# Patient Record
Sex: FEMALE | Race: OTHER | Employment: OTHER | URBAN - METROPOLITAN AREA
[De-identification: names, ages, dates, MRNs, and addresses within clinical notes are randomized per-mention and may not be internally consistent; named-entity substitution may affect disease eponyms.]

---

## 2022-06-11 ENCOUNTER — HOSPITAL ENCOUNTER (OUTPATIENT)
Dept: GENERAL RADIOLOGY | Facility: HOSPITAL | Age: 72
Discharge: HOME | End: 2022-06-11
Attending: INTERNAL MEDICINE
Payer: COMMERCIAL

## 2022-06-11 ENCOUNTER — LAB ENCOUNTER (OUTPATIENT)
Dept: LAB | Facility: HOSPITAL | Age: 72
End: 2022-06-11
Attending: INTERNAL MEDICINE
Payer: COMMERCIAL

## 2022-06-11 ENCOUNTER — OFFICE VISIT (OUTPATIENT)
Dept: RHEUMATOLOGY | Facility: CLINIC | Age: 72
End: 2022-06-11
Payer: COMMERCIAL

## 2022-06-11 VITALS
HEART RATE: 69 BPM | WEIGHT: 159.69 LBS | SYSTOLIC BLOOD PRESSURE: 134 MMHG | BODY MASS INDEX: 31.35 KG/M2 | DIASTOLIC BLOOD PRESSURE: 72 MMHG | HEIGHT: 60 IN

## 2022-06-11 DIAGNOSIS — H04.123 BILATERAL DRY EYES: ICD-10-CM

## 2022-06-11 DIAGNOSIS — M06.4 INFLAMMATORY POLYARTHRITIS (HCC): ICD-10-CM

## 2022-06-11 DIAGNOSIS — M54.2 NECK PAIN: ICD-10-CM

## 2022-06-11 DIAGNOSIS — M06.4 INFLAMMATORY POLYARTHRITIS (HCC): Primary | ICD-10-CM

## 2022-06-11 DIAGNOSIS — M10.9 GOUT OF MULTIPLE SITES, UNSPECIFIED CAUSE, UNSPECIFIED CHRONICITY: ICD-10-CM

## 2022-06-11 DIAGNOSIS — M54.50 CHRONIC BILATERAL LOW BACK PAIN WITHOUT SCIATICA: ICD-10-CM

## 2022-06-11 DIAGNOSIS — M79.18 DIFFUSE MYOFASCIAL PAIN SYNDROME: ICD-10-CM

## 2022-06-11 DIAGNOSIS — G89.29 CHRONIC BILATERAL LOW BACK PAIN WITHOUT SCIATICA: ICD-10-CM

## 2022-06-11 LAB
CRP SERPL-MCNC: <0.29 MG/DL (ref ?–0.3)
ERYTHROCYTE [SEDIMENTATION RATE] IN BLOOD: 11 MM/HR
RHEUMATOID FACT SERPL-ACNC: <10 IU/ML (ref ?–15)
URATE SERPL-MCNC: 6.1 MG/DL

## 2022-06-11 PROCEDURE — 85652 RBC SED RATE AUTOMATED: CPT

## 2022-06-11 PROCEDURE — 86038 ANTINUCLEAR ANTIBODIES: CPT

## 2022-06-11 PROCEDURE — 86140 C-REACTIVE PROTEIN: CPT

## 2022-06-11 PROCEDURE — 73130 X-RAY EXAM OF HAND: CPT | Performed by: INTERNAL MEDICINE

## 2022-06-11 PROCEDURE — 84550 ASSAY OF BLOOD/URIC ACID: CPT

## 2022-06-11 PROCEDURE — 36415 COLL VENOUS BLD VENIPUNCTURE: CPT

## 2022-06-11 PROCEDURE — 86431 RHEUMATOID FACTOR QUANT: CPT

## 2022-06-11 PROCEDURE — 72100 X-RAY EXAM L-S SPINE 2/3 VWS: CPT | Performed by: INTERNAL MEDICINE

## 2022-06-11 PROCEDURE — 86200 CCP ANTIBODY: CPT

## 2022-06-11 PROCEDURE — 72040 X-RAY EXAM NECK SPINE 2-3 VW: CPT | Performed by: INTERNAL MEDICINE

## 2022-06-11 PROCEDURE — 73630 X-RAY EXAM OF FOOT: CPT | Performed by: INTERNAL MEDICINE

## 2022-06-11 PROCEDURE — 73560 X-RAY EXAM OF KNEE 1 OR 2: CPT | Performed by: INTERNAL MEDICINE

## 2022-06-11 RX ORDER — MEMANTINE HYDROCHLORIDE 10 MG/1
10 TABLET ORAL 2 TIMES DAILY
COMMUNITY
Start: 2022-04-20

## 2022-06-11 RX ORDER — LISINOPRIL 20 MG/1
40 TABLET ORAL EVERY MORNING
COMMUNITY
Start: 2022-04-12

## 2022-06-11 RX ORDER — DONEPEZIL HYDROCHLORIDE 5 MG/1
5 TABLET, FILM COATED ORAL NIGHTLY
COMMUNITY
Start: 2022-06-04

## 2022-06-11 RX ORDER — BLACK COHOSH ROOT EXTRACT 80 MG
1 CAPSULE ORAL DAILY
COMMUNITY

## 2022-06-11 RX ORDER — CARVEDILOL 6.25 MG/1
6.25 TABLET ORAL 2 TIMES DAILY
COMMUNITY
Start: 2022-06-07

## 2022-06-11 RX ORDER — ASCORBIC ACID 250 MG
250 TABLET ORAL DAILY
COMMUNITY

## 2022-06-11 RX ORDER — OMEPRAZOLE 20 MG/1
20 CAPSULE, DELAYED RELEASE ORAL 2 TIMES DAILY
COMMUNITY
Start: 2022-04-18

## 2022-06-11 RX ORDER — HYDROXYZINE HYDROCHLORIDE 25 MG/1
1 TABLET, FILM COATED ORAL NIGHTLY
COMMUNITY
Start: 2022-06-03

## 2022-06-11 NOTE — PROGRESS NOTES
Dear Dr. Rosibel Mcclendon:    I saw your patient Sarai Recinos in consultation this morning in my rheumatology clinic. A  Shai Tam ([id number]) was on the phone line with us. She has had arthritis for along time, which is gotten worse the last 6 months. She has bony enlargement, especially of her right fourth finger PIP joint, right thumb IP joint, left fourth and fifth DIP joints. Her thumb bases are squared. She has bony enlargement at the base of her great toes. Her knees hurt, left more than right. Several months ago her left knee was injected, with benefit. She tosses and turns at night. Her energy is low. Everything hurts, including her muscles and joints. When she gets out of bed, she has a lot of pain. Her joints are more swollen in her hands and feet. It is very hard to  the morning. She states that she has had gout. She states that joints can get red and hot, including her right fourth finger PIP joint and left fourth and fifth DIP joints. I have lab results from February 16th, 2022. CBC, CMP, TSH are normal, except ALT of 37. Past medical history:  She has Alzheimer's disease, dementia, anxiety and depression. She recently had a brain MRI done for decreased memory in [facility] through [facility]. She has chronic insomnia. She has high cholesterol, history of gout, hypertension, acid reflux. She is on carvedilol. She was on colchicine 0.6 mg daily months ago. It helped but was not refilled. She takes Aricept, Namenda which help, although she still forgets. She takes hydroxyzine 25 mg at bedtime as needed for anxiety. She is on lisinopril, omega-3 fatty acids, omeprazole, vitamin D. She denies drug allergies. Family history:  She denies arthritis. Social history:  She is single and is living with her daughter who is her caretaker. She moved to [place] from [facility] December 26, 2021. She is retired. No cigarettes for 20 years.   No alcohol or caffeine. She walks 20 to 30 minutes a day. Review of systems:  She complains of the looks of her nose and face. No fevers, chills, sweats, anorexia, weight loss. No rash. Some hair thinning. No internal eye inflammation although she is not happy that they are too small. She has a lot of anxiety according to her daughter. She can get short of breath going up 1 flight of stairs. She can have pain across her chest.  She denies stomach problems. She has some urine incontinence. She has very dry eyes and mouth. She denies that her fingers turn white or blue when they get cold. Physical exam:  Pleasant anxious woman in no acute distress. Blood pressure 134/72, pulse 69, height 5' (1.524 m), weight 159 lb 11.2 oz (72.4 kg). No rash. No alopecia. No conjunctival injection. No nasal or oral lesions. Good salivary pool. No cervical adenopathy. Lungs clear. S1 and S2 regular. Abdomen without hepatosplenomegaly or tenderness. Normal bowel sounds. No lower extremity pitting edema. Neck motion is mildly limited. Shoulders move okay. Elbows flex and extend fully. Wrist move well in flexion and extension with possibly some swelling ulnarly. No synovitis across her second through fifth MCP joints. She has what appear to be large Heberden's and Vladimir's nodes. Her right fourth finger PIP joint is especially large. Her left fourth and fifth DIP joints are large without obvious soft tissue swelling. Capillary refill is good in her fingertips.  strength is okay. Lumbar spine flexion is mildly limited. Hips move well. There is crepitance with flexion and extension of her knees, left more than right. There is a small effusion in her left knee without erythema or warmth. Ankles move okay. She has bony enlargement at the base of her great toes with lateral subluxation, right more than left without soft tissue component.   She has diffuse myofascial discomfort to palpation from her neck into her shoulders, across her upper and lower back, lateral hips, lateral elbows, etc.    Assessment and plan:    1. Osteoarthritis, with Heberden's and Vladimir's nodes in her hands, bunion deformity, left knee involvement, etc.    2.  Diffuse myofascial discomfort. Chronic insomnia. Anxiety and depression. Dementia. All interrelated. 3.  Apparent history of gout. It is not active today. Uric acid will be done. She states that colchicine 0.6 mg once daily had helped, months ago. 4.  Rule out autoimmune arthritis. This seems unlikely. Rheumatoid factor, CCP antibody, and inflammation markers will be done. She complains of dry eyes and mouth, so RADHA will be done. 5.  Polyarthritis. Diffuse pain. X-rays will be done of her hands, right foot, left knee, low back, and cervical spine. Her results will be reviewed on return in several weeks. 6.  Alzheimer's disease, dementia. 7.  Acid reflux, hypertension. High cholesterol. Thank you for inviting me to participate in her care. Sincerely,      Caryle Spalding MD   Rheumatology.

## 2022-06-13 LAB — NUCLEAR IGG TITR SER IF: NEGATIVE {TITER}

## 2022-06-14 LAB — CCP IGG SERPL-ACNC: 1.1 U/ML (ref 0–6.9)

## 2022-07-08 ENCOUNTER — OFFICE VISIT (OUTPATIENT)
Dept: RHEUMATOLOGY | Facility: CLINIC | Age: 72
End: 2022-07-08
Payer: COMMERCIAL

## 2022-07-08 VITALS
DIASTOLIC BLOOD PRESSURE: 65 MMHG | SYSTOLIC BLOOD PRESSURE: 102 MMHG | WEIGHT: 160 LBS | HEART RATE: 61 BPM | HEIGHT: 60 IN | BODY MASS INDEX: 31.41 KG/M2

## 2022-07-08 DIAGNOSIS — M15.9 PRIMARY OSTEOARTHRITIS INVOLVING MULTIPLE JOINTS: Primary | ICD-10-CM

## 2022-07-08 PROCEDURE — 3078F DIAST BP <80 MM HG: CPT | Performed by: INTERNAL MEDICINE

## 2022-07-08 PROCEDURE — 99213 OFFICE O/P EST LOW 20 MIN: CPT | Performed by: INTERNAL MEDICINE

## 2022-07-08 PROCEDURE — 3074F SYST BP LT 130 MM HG: CPT | Performed by: INTERNAL MEDICINE

## 2022-07-08 PROCEDURE — 3008F BODY MASS INDEX DOCD: CPT | Performed by: INTERNAL MEDICINE

## 2022-07-08 NOTE — PROGRESS NOTES
Dear Dr. Megan Abrams:    I saw your patient Narayan Sosa in follow-up this morning in my rheumatology clinic. Please see my consult from June 11th. A  was again on the phone with us, Whit Stratton ([id number]). We reviewed her lab results from June 11th, 2022. Uric acid was borderline at 6.1. Sed rate normal at 11, and C-reactive protein normal at less than 0.29. Rheumatoid factor, CCP antibody, RADHA negative. We reviewed her x-rays on the computer. Lumbar spine x-rays show degenerative disc and facet disease, especially at L4-5, and L5-S1. Bilateral hand x-rays show severe erosive osteoarthritis in multiple PIP and DIP joints. Significant in her thumb bases. Right foot x-rays show osteoarthritis, especially at the base of her great toe and in her midfoot. Left knee x-rays 3 compartment osteoarthritis, especially severe in her medial compartment. Cervical spine x-rays, spondylosis diffusely. Physical exam:  Pleasant woman in no acute distress. Blood pressure 102/65, pulse 61, height 5' (1.524 m), weight 160 lb (72.6 kg). Otherwise unchanged. Assessment and plan:    1. Diffuse osteoarthritis. She was given the up-to-date article in [place] on osteoarthritis, and it was discussed. She can use diclofenac gel as needed for her knuckles. She will try Tylenol 1000 mg 3 times a day. I will be glad to see her back if needed. 2.  I reassured her that she does not have autoimmune arthritis. 3.  Diffuse myofascial discomfort, chronic insomnia, anxiety and depression, and dementia. 4.  History of possible gout. Not active presently. NSAIDS as needed for attack. I encouraged her to stay as active as possible. Thanks again for inviting me to participate in her care. Sincerely,      Fanny Dawkins MD   Rheumatology.